# Patient Record
Sex: FEMALE | Race: WHITE | Employment: FULL TIME | URBAN - NONMETROPOLITAN AREA
[De-identification: names, ages, dates, MRNs, and addresses within clinical notes are randomized per-mention and may not be internally consistent; named-entity substitution may affect disease eponyms.]

---

## 2024-01-18 NOTE — PROGRESS NOTES
Spoke with patient and she will arrive at [facility] [place] at 0915 on 1/26/2024 for her procedure at 1045. Patient has been given instructions.

## 2024-01-24 NOTE — PROGRESS NOTES
1/24/26 - Patient called to review instructions for surgery on 1/26/24    Surgery @ [facility] on 1/26/24 at 1045, arrival 0845     NOTHING TO EAT OR DRINK AFTER MIDNIGHT DAY OF SURGERY     1. Enter thru the hospital main entrance on day of surgery, check in at the Information Desk. If you arrive prior to 6:00am, enter thru the ER entrance.     2. Follow the directions as prescribed by the doctor for your procedure and medications.          Morning of surgery take:  Levothyroxine, Sucralfate & Nexium          Stop vitamins, supplements and NSAIDS:  7 days prior to surgery     3. Check with your Doctor regarding stopping blood thinners and follow their instructions.     4. Do not smoke, vape or use chewing tobacco morning of surgery. Do not drink any alcoholic beverages 24 hours prior to surgery.       This includes NA Beer. No street drugs 7 days prior to surgery.     5. If you have dentures, contacts of glasses they will be removed before going to the OR; please bring a case.     6. Please bring picture ID, insurance card, paperwork from the doctor’s office (H & P, Consent, & card for implantable devices).     7. You will need a responsible adult to drive you home after surgery

## 2024-01-25 ENCOUNTER — ANESTHESIA EVENT (OUTPATIENT)
Dept: OPERATING ROOM | Age: 54
End: 2024-01-25
Payer: COMMERCIAL

## 2024-01-25 NOTE — ANESTHESIA PRE PROCEDURE
(CLARITIN) 10 MG capsule Take 1 capsule by mouth daily     • Multiple Vitamins-Minerals (THERAPEUTIC MULTIVITAMIN-MINERALS) tablet Take 1 tablet by mouth daily     • hydrOXYzine HCl (ATARAX) 25 MG tablet Take 1 tablet by mouth every 6 hours as needed for Anxiety     • citalopram (CELEXA) 20 MG tablet Take 1 tablet by mouth daily     • tiZANidine (ZANAFLEX) 2 MG tablet Take 1 tablet by mouth every 8 hours as needed     • sucralfate (CARAFATE) 1 GM tablet Take 1 tablet by mouth 4 times daily     • esomeprazole Magnesium (NEXIUM) 40 MG PACK Take 1 packet by mouth daily     • famotidine (PEPCID) 40 MG tablet Take 1 tablet by mouth nightly     • levothyroxine (SYNTHROID) 125 MCG tablet Take 1 tablet by mouth Daily     • colchicine (COLCRYS) 0.6 MG tablet Take 1 tablet by mouth daily     • Multiple Vitamins-Minerals (ICAPS AREDS FORMULA PO) Take 1 capsule by mouth daily         Allergies:    Allergies   Allergen Reactions   • Cymbalta [Duloxetine Hcl] Other (See Comments)     Agitation   • Prednisone Other (See Comments)     Agitation       Problem List:  There is no problem list on file for this patient.      Past Medical History:        Diagnosis Date   • Anxiety    • Depression    • GERD (gastroesophageal reflux disease)    • Hallux rigidus of right foot    • Hyperlipidemia    • Macular degeneration disease    • Thyroid disease        Past Surgical History:        Procedure Laterality Date   • CARPAL TUNNEL RELEASE Bilateral    • CHOLECYSTECTOMY  2014   • ESOPHAGOGASTRODUODENOSCOPY  2021       Social History:    Social History     Tobacco Use   • Smoking status: Former     Current packs/day: 0.00     Types: Cigarettes     Quit date: 2019     Years since quittin.0   • Smokeless tobacco: Current   Substance Use Topics   • Alcohol use: Not Currently     Alcohol/week: 3.0 standard drinks of alcohol     Types: 3 Cans of beer per week                                Ready to quit: Not Answered  Counseling given:

## 2024-01-26 ENCOUNTER — APPOINTMENT (OUTPATIENT)
Dept: GENERAL RADIOLOGY | Age: 54
End: 2024-01-26
Attending: PODIATRIST
Payer: COMMERCIAL

## 2024-01-26 ENCOUNTER — ANESTHESIA (OUTPATIENT)
Dept: OPERATING ROOM | Age: 54
End: 2024-01-26
Payer: COMMERCIAL

## 2024-01-26 ENCOUNTER — HOSPITAL ENCOUNTER (OUTPATIENT)
Age: 54
Setting detail: OUTPATIENT SURGERY
Discharge: HOME | End: 2024-01-26
Attending: PODIATRIST | Admitting: PODIATRIST
Payer: COMMERCIAL

## 2024-01-26 VITALS
RESPIRATION RATE: 12 BRPM | HEART RATE: 69 BPM | TEMPERATURE: 96.8 F | DIASTOLIC BLOOD PRESSURE: 75 MMHG | SYSTOLIC BLOOD PRESSURE: 122 MMHG | OXYGEN SATURATION: 98 %

## 2024-01-26 DIAGNOSIS — M20.5X1 HALLUX LIMITUS OF RIGHT FOOT: Primary | ICD-10-CM

## 2024-01-26 PROCEDURE — 76000 FLUOROSCOPY <1 HR PHYS/QHP: CPT

## 2024-01-26 PROCEDURE — 7100000001 HC PACU RECOVERY - ADDTL 15 MIN: Performed by: PODIATRIST

## 2024-01-26 PROCEDURE — 6360000002 HC RX W HCPCS: Performed by: PODIATRIST

## 2024-01-26 PROCEDURE — 7100000000 HC PACU RECOVERY - FIRST 15 MIN: Performed by: PODIATRIST

## 2024-01-26 PROCEDURE — 7100000010 HC PHASE II RECOVERY - FIRST 15 MIN: Performed by: PODIATRIST

## 2024-01-26 PROCEDURE — 6370000000 HC RX 637 (ALT 250 FOR IP): Performed by: PODIATRIST

## 2024-01-26 PROCEDURE — 3700000001 HC ADD 15 MINUTES (ANESTHESIA): Performed by: PODIATRIST

## 2024-01-26 PROCEDURE — 3600000004 HC SURGERY LEVEL 4 BASE: Performed by: PODIATRIST

## 2024-01-26 PROCEDURE — 7100000011 HC PHASE II RECOVERY - ADDTL 15 MIN: Performed by: PODIATRIST

## 2024-01-26 PROCEDURE — 2580000003 HC RX 258: Performed by: PODIATRIST

## 2024-01-26 PROCEDURE — 2500000003 HC RX 250 WO HCPCS: Performed by: NURSE ANESTHETIST, CERTIFIED REGISTERED

## 2024-01-26 PROCEDURE — 3600000014 HC SURGERY LEVEL 4 ADDTL 15MIN: Performed by: PODIATRIST

## 2024-01-26 PROCEDURE — 2709999900 HC NON-CHARGEABLE SUPPLY: Performed by: PODIATRIST

## 2024-01-26 PROCEDURE — 6360000002 HC RX W HCPCS: Performed by: NURSE ANESTHETIST, CERTIFIED REGISTERED

## 2024-01-26 PROCEDURE — 3700000000 HC ANESTHESIA ATTENDED CARE: Performed by: PODIATRIST

## 2024-01-26 PROCEDURE — 2720000010 HC SURG SUPPLY STERILE: Performed by: PODIATRIST

## 2024-01-26 PROCEDURE — C1713 ANCHOR/SCREW BN/BN,TIS/BN: HCPCS | Performed by: PODIATRIST

## 2024-01-26 DEVICE — ANATOMIC BIPLANAR FIXATION
Type: IMPLANTABLE DEVICE | Site: FOOT | Status: FUNCTIONAL
Brand: LAPIPLASTY SYSTEM 1

## 2024-01-26 DEVICE — 2.7MM HIGH PITCH LOCKING SCREW - 12MM/14MM
Type: IMPLANTABLE DEVICE | Site: FOOT | Status: FUNCTIONAL
Brand: FASTPITCH

## 2024-01-26 RX ORDER — BUPIVACAINE HYDROCHLORIDE 5 MG/ML
INJECTION, SOLUTION EPIDURAL; INTRACAUDAL
Status: COMPLETED | OUTPATIENT
Start: 2024-01-26 | End: 2024-01-26

## 2024-01-26 RX ORDER — OXYCODONE HYDROCHLORIDE AND ACETAMINOPHEN 5; 325 MG/1; MG/1
1 TABLET ORAL ONCE
Status: COMPLETED | OUTPATIENT
Start: 2024-01-26 | End: 2024-01-26

## 2024-01-26 RX ORDER — SODIUM CHLORIDE 0.9 % (FLUSH) 0.9 %
5-40 SYRINGE (ML) INJECTION EVERY 12 HOURS SCHEDULED
Status: CANCELLED | OUTPATIENT
Start: 2024-01-26

## 2024-01-26 RX ORDER — ONDANSETRON 2 MG/ML
INJECTION INTRAMUSCULAR; INTRAVENOUS PRN
Status: DISCONTINUED | OUTPATIENT
Start: 2024-01-26 | End: 2024-01-26 | Stop reason: SDUPTHER

## 2024-01-26 RX ORDER — PROPOFOL 10 MG/ML
INJECTION, EMULSION INTRAVENOUS PRN
Status: DISCONTINUED | OUTPATIENT
Start: 2024-01-26 | End: 2024-01-26 | Stop reason: SDUPTHER

## 2024-01-26 RX ORDER — SODIUM CHLORIDE 9 MG/ML
INJECTION, SOLUTION INTRAVENOUS PRN
Status: CANCELLED | OUTPATIENT
Start: 2024-01-26

## 2024-01-26 RX ORDER — FENTANYL CITRATE 50 UG/ML
INJECTION, SOLUTION INTRAMUSCULAR; INTRAVENOUS PRN
Status: DISCONTINUED | OUTPATIENT
Start: 2024-01-26 | End: 2024-01-26 | Stop reason: SDUPTHER

## 2024-01-26 RX ORDER — SODIUM CHLORIDE, SODIUM LACTATE, POTASSIUM CHLORIDE, CALCIUM CHLORIDE 600; 310; 30; 20 MG/100ML; MG/100ML; MG/100ML; MG/100ML
INJECTION, SOLUTION INTRAVENOUS CONTINUOUS
Status: DISCONTINUED | OUTPATIENT
Start: 2024-01-26 | End: 2024-01-26 | Stop reason: HOSPADM

## 2024-01-26 RX ORDER — SODIUM CHLORIDE 0.9 % (FLUSH) 0.9 %
5-40 SYRINGE (ML) INJECTION PRN
Status: CANCELLED | OUTPATIENT
Start: 2024-01-26

## 2024-01-26 RX ORDER — LIDOCAINE HYDROCHLORIDE 20 MG/ML
INJECTION, SOLUTION EPIDURAL; INFILTRATION; INTRACAUDAL; PERINEURAL PRN
Status: DISCONTINUED | OUTPATIENT
Start: 2024-01-26 | End: 2024-01-26 | Stop reason: SDUPTHER

## 2024-01-26 RX ORDER — FENTANYL CITRATE 50 UG/ML
25 INJECTION, SOLUTION INTRAMUSCULAR; INTRAVENOUS EVERY 5 MIN PRN
Status: CANCELLED | OUTPATIENT
Start: 2024-01-26

## 2024-01-26 RX ADMIN — LIDOCAINE HYDROCHLORIDE 100 MG: 20 INJECTION, SOLUTION EPIDURAL; INFILTRATION; INTRACAUDAL; PERINEURAL at 12:19

## 2024-01-26 RX ADMIN — SODIUM CHLORIDE, POTASSIUM CHLORIDE, SODIUM LACTATE AND CALCIUM CHLORIDE: 600; 310; 30; 20 INJECTION, SOLUTION INTRAVENOUS at 09:22

## 2024-01-26 RX ADMIN — FENTANYL CITRATE 50 MCG: 50 INJECTION INTRAMUSCULAR; INTRAVENOUS at 12:27

## 2024-01-26 RX ADMIN — FENTANYL CITRATE 50 MCG: 50 INJECTION INTRAMUSCULAR; INTRAVENOUS at 12:23

## 2024-01-26 RX ADMIN — PROPOFOL 200 MG: 10 INJECTION, EMULSION INTRAVENOUS at 12:19

## 2024-01-26 RX ADMIN — FENTANYL CITRATE 50 MCG: 50 INJECTION INTRAMUSCULAR; INTRAVENOUS at 13:14

## 2024-01-26 RX ADMIN — OXYCODONE AND ACETAMINOPHEN 1 TABLET: 5; 325 TABLET ORAL at 14:42

## 2024-01-26 RX ADMIN — CEFAZOLIN 2000 MG: 2 INJECTION, POWDER, FOR SOLUTION INTRAMUSCULAR; INTRAVENOUS at 12:24

## 2024-01-26 RX ADMIN — ONDANSETRON 4 MG: 2 INJECTION INTRAMUSCULAR; INTRAVENOUS at 12:26

## 2024-01-26 RX ADMIN — FENTANYL CITRATE 50 MCG: 50 INJECTION INTRAMUSCULAR; INTRAVENOUS at 12:45

## 2024-01-26 ASSESSMENT — PAIN - FUNCTIONAL ASSESSMENT
PAIN_FUNCTIONAL_ASSESSMENT: PREVENTS OR INTERFERES SOME ACTIVE ACTIVITIES AND ADLS
PAIN_FUNCTIONAL_ASSESSMENT: 0-10
PAIN_FUNCTIONAL_ASSESSMENT: ACTIVITIES ARE NOT PREVENTED

## 2024-01-26 ASSESSMENT — PAIN DESCRIPTION - DESCRIPTORS
DESCRIPTORS: BURNING;STABBING
DESCRIPTORS: ACHING

## 2024-01-26 ASSESSMENT — PAIN SCALES - GENERAL: PAINLEVEL_OUTOF10: 7

## 2024-01-26 NOTE — PROGRESS NOTES
1429 received pt from pacu.  Pt is awake and alert, monitors applied, alarms on, family is at bedside, vss, ice chips, starry and crackers given.  Pt c/o pain 9/10.  Pt medicated for pain @ 1442.  Pt rates pain @ 7/10, tolerable @ 1512.  Pt ready to get dressed for d/c, vss, family at bedside.  D/c instructions given to , Ed,  and pt's mother.  Iv removed.  Pt to get dressed for d/c.  1530 pt d/c with  to home via w/c per nurse.  Pt stable at d/c.

## 2024-01-26 NOTE — BRIEF OP NOTE
Brief Postoperative Note      Patient: Olinda Monson  YOB: 1970  MRN: [id number]    Date of Procedure: 1/26/2024    Pre-Op Diagnosis Codes:     * Hallux rigidus, right foot [M20.21]     * Pain in right foot [M79.671]    Post-Op Diagnosis: Same       Procedure(s):  RIGHT GREAT TOE MP JT ARTHRODESIS  RIGHT REMOVAL MAJOR/LARGE BONE FOR GRAFT    Surgeon(s):  Daniele Chen DPM    Assistant:  * No surgical staff found *    Anesthesia: General    Estimated Blood Loss (mL): Minimal    Complications: None    Specimens:   * No specimens in log *    Implants:  Implant Name Type Inv. Item Serial No.  Lot No. LRB No. Used Action   PLATE BNE W3.6MM W/ 5 L12MM DIA2.5MM AND 4 L14MM DIA2.5MM - [id number]  PLATE BNE W3.6MM W/ 5 L12MM DIA2.5MM AND 4 L14MM DIA2.5MM  [employer]- [id number] Right 1 Implanted   SCREW LOCKING HIGH PITCH 2.7MM FASTPITCH - [id number]  SCREW LOCKING HIGH PITCH 2.7MM FASTPITCH  [employer]- [id number] Right 2 Implanted         Drains: * No LDAs found *    Findings: as expected      Electronically signed by Daniele Chen DPM on 1/26/2024 at 1:57 PM

## 2024-01-26 NOTE — ANESTHESIA POSTPROCEDURE EVALUATION
Department of Anesthesiology  Postprocedure Note    Patient: Olinda Monson  MRN: [id number]  YOB: 1970  Date of evaluation: 1/26/2024    Procedure Summary       Date: 01/26/24 Room / Location: [address]    Anesthesia Start: 1214 Anesthesia Stop: 1400    Procedures:       RIGHT GREAT TOE MP JT ARTHRODESIS (Right: Foot)      RIGHT REMOVAL MAJOR/LARGE BONE FOR GRAFT (Right: Foot) Diagnosis:       Hallux rigidus, right foot      Pain in right foot      (Hallux rigidus, right foot [M20.21])      (Pain in right foot [M79.671])    Surgeons: Daniele Chen DPM Responsible Provider: Irvin Salazar APRN - CRNA    Anesthesia Type: general ASA Status: 2            Anesthesia Type: No value filed.    Rashi Phase I:      Rashi Phase II:      Anesthesia Post Evaluation    Patient location during evaluation: bedside  Patient participation: complete - patient participated  Level of consciousness: awake and alert  Pain score: 0  Airway patency: patent  Nausea & Vomiting: no vomiting and no nausea  Cardiovascular status: blood pressure returned to baseline and hemodynamically stable  Respiratory status: acceptable, spontaneous ventilation, nonlabored ventilation and nasal cannula  Hydration status: stable  Pain management: adequate    No notable events documented.

## 2024-01-26 NOTE — H&P
..Update History & Physical    The patient's History and Physical of January 26, 2024 was reviewed with the patient and I examined the patient. There was no change. The surgical site was confirmed by the patient and me.       Plan: The risks, benefits, expected outcome, and alternative to the recommended procedure have been discussed with the patient. Patient understands and wants to proceed with the procedure.     Electronically signed by Daniele Chen DPM on 1/26/2024 at 11:49 AM

## 2024-01-26 NOTE — PROGRESS NOTES
Pt. Arrived in PACU via cart from the OR. Brakes applied, side rails up x 2. On oxygen via NC @ 4L. Pt. Drowsy, but arouses to voice and touch. Denies pain or nausea at this time. Dressing to right foot is clean, dry, intact. No drainage noted. Foot elevated off of bed on blankets. IV infusing without difficulty. Bedside report received from DOMINIC Irby and JOSE MANUEL Bryan. Will continue to monitor via bedside.

## 2024-01-26 NOTE — PROGRESS NOTES
Pt. Awake, alert, and oriented x 4. On room air, vs stable. Pt. Denies nausea. C/o pain in right foot at 9/10 now. Will be medicated in [facility] per verbal order from Dr. Chen. Dressing to right foot is clean, dry, intact. No drainage noted. Foot elevated with ice pack in place. Iv infusing without difficulty. Pt. Will be transported back to [facility] via cart.

## 2024-01-26 NOTE — DISCHARGE INSTRUCTIONS
[facility]  [phone]    Do not drive, work around machines or use equipment.  Do not drink any alcoholic beverages.  Do not smoke while alone.  Avoid making important decisions.  Plan to spend a quiet, relaxed evening @ home.  Resume normal activities as you begin to feel better.  Eat lightly for your first meal, then gradually increase your diet to what is normal for you.  In case of nausea, avoid food and drink only clear liquids.  Resume food as nausea ceases.  Notify your surgeon if you experience fever, chills, large amount of bleeding, difficulty breathing, persistent nausea and vomiting or any other disturbing problem.  Call for a follow-up appointment with your surgeon.

## 2024-01-27 NOTE — OP NOTE
[address] [address]                                OPERATIVE REPORT    PATIENT NAME: TYLER ZHONG                    :        1970  MED REC NO:   [id number]                          ROOM:  ACCOUNT NO:   [id number]                           ADMIT DATE: 2024  PROVIDER:     Daniele Chen DPM    DATE OF PROCEDURE:  2024    ATTENDING PROVIDER:  Daniele Chen DPM    PREOPERATIVE DIAGNOSIS:  Hallux limitus on the right.    POSTOPERATIVE DIAGNOSIS:  Hallux limitus on the right.    PROCEDURES:  1.  Right great toe first MPJ arthrodesis.  2.  Calcaneal bone graft harvest.    ANESTHESIA:  General.    HEMOSTASIS:  Pneumatic ankle tourniquet set at 250 mmHg.    ESTIMATED BLOOD LOSS:  Minimal.    MATERIALS:  Two four-hole Treace locking plates for the MPJ, each plate  utilizing two 2.7 mm x 14 mm FastPitch locking screws and two 2.7 mm x  12 mm FastPitch locking screws, the FastGrafter was also utilized, and  we got 1 mL of bone graft from the calcaneus as well.    INJECTABLES:  18 mL of Marcaine plain.    COMPLICATIONS:  None.    CONDITION:  Stable.    INDICATIONS FOR PROCEDURE:  The patient is a very pleasant 53-year-old  female that has been seen in the office for quite sometime.  The patient  has had continuing pain associated to the right great toe.  The patient  subsequently was educated about her hallux limitus and arthritic  condition of the right great toe.  I went over all risks, benefits, and  complications of the procedure in detail.  The patient chose and elected  to proceed with surgical intervention versus that of conservative  options.  The patient's consent was signed and submitted to the chart.   All questions were answered prior to proceeding.  The patient  understands that initially it is a six-to-eight weeks recovery, she may  not get her final result for up to a year.    OPERATIVE PROCEDURE:   we removed the excess bone  with a rongeur and then cleaned up the area, debrided any thickening of  the capsule, irrigated the region with copious amounts of normal sterile  saline, and then, we used a retrograding K-wire technique to get the  great toe into the position that we wanted to so we could fuse it.   Following this, we did another K-wire to hold the rotation that was put  it like an angle that went from medial to lateral and gave us further  stabilization.  At this time, we went ahead and put in our lateral  plate.  We did that using proper AO technique for the Treace set, and we  put our 14 mm screws proximal and our 12 mm screws distal.  We got  excellent position and alignment on that and so next we moved medially.   We once again put our plate into position and utilized the proper  technique for this and placed our screws 14 mm proximal x2 and 12 mm 2.7  screws distal x2.  This gave a very good strong construct.  It should be  noted that we did have _____ flat surface to see, and we had just about  4 mm of dorsiflexion and noncontact with the great toe joint what we  would want to help with _____.  It should be noted that prior to that,  we did pack in the 1 mL of bone graft harvest into the site before we  completed and plated this region.  All K-wires were removed.  The area  was then closed with 3-0 Vicryl for the capsule, 4-0 Vicryl for the  subcutaneous and subcuticular regions, and 4-0 Nylon for the skin.  We  did two simple interrupted sutures distally as well.  The patient then  had 18 mL of Marcaine plain injected, then it was dressed with  Betadine-soaked Adaptic, 4 x 4's, Kerlix, and an Ace .  The patient  tolerated the procedure and anesthesia well and left the OR with vital  signs stable and vascular status intact to the right foot.  The patient  will be seen back in our office next week for a followup appointment.   She has Percocet for pain.  She has a scooter.  She has crutches.

## (undated) DEVICE — MARKER SURG SKIN UTIL REGULAR/FINE 2 TIP W/ RUL AND 9 LBL

## (undated) DEVICE — ANESTHESIA CIRCUIT ADULT-LF: Brand: MEDLINE INDUSTRIES, INC.

## (undated) DEVICE — NEEDLE HYPO 25GA L1.5IN BLU POLYPR HUB S STL REG BVL STR

## (undated) DEVICE — GLOVE ORANGE PI 8   MSG9080

## (undated) DEVICE — SYRINGE IRRIG 60ML SFT PLIABLE BLB EZ TO GRP 1 HND USE W/

## (undated) DEVICE — SUTURE VCRL SZ 4-0 L18IN ABSRB UD L13MM P-3 3/8 CIR PRIM J494H

## (undated) DEVICE — DRESSING PETRO W3XL3IN OIL EMUL N ADH GZ KNIT IMPREG CELOS

## (undated) DEVICE — YANKAUER,FLEXIBLE HANDLE,REGLR CAPACITY: Brand: MEDLINE INDUSTRIES, INC.

## (undated) DEVICE — SOLUTION PREP PAINT POV IOD FOR SKIN MUCOUS MEM

## (undated) DEVICE — BANDAGE,ELASTIC,ESMARK,STERILE,4"X9',LF: Brand: MEDLINE

## (undated) DEVICE — C-ARMOR C-ARM EQUIPMENT COVERS CLEAR STERILE UNIVERSAL FIT 12 PER CASE: Brand: C-ARMOR

## (undated) DEVICE — TOWEL,OR,DSP,ST,BLUE,STD,6/PK,12PK/CS: Brand: MEDLINE

## (undated) DEVICE — BANDAGE COMPR W4INXL5YD WHT BGE POLY COT M E WRP WV HK AND

## (undated) DEVICE — DRAPE SHEET ULTRAGARD: Brand: MEDLINE

## (undated) DEVICE — BLADE SURG NO15 C STL SHRP PREM

## (undated) DEVICE — LINER SUCT CANSTR 1500CC SEMI RIG W/ POR HYDROPHOBIC SHUT

## (undated) DEVICE — SUTURE NONABSORBABLE MONOFILAMENT 4-0 P-3 18 IN ETHILON 699H

## (undated) DEVICE — AUTOGRAFT HARVESTING SYSTEM: Brand: FASTGRAFTER

## (undated) DEVICE — TUBING, SUCTION, 1/4" X 10', STRAIGHT: Brand: MEDLINE

## (undated) DEVICE — CONTAINER,SPECIMEN,OR STERILE,4OZ: Brand: MEDLINE

## (undated) DEVICE — CONVERTORS STOCKINETTE: Brand: CONVERTORS

## (undated) DEVICE — SNAP KOVER: Brand: UNBRANDED

## (undated) DEVICE — Z INACTIVE USE 2863041 SPONGE GZ W4XL4IN 100% COT 16 PLY RADPQ HIGHLY ABSRB

## (undated) DEVICE — BLADE SAW 40X11X0.38 MM REPL 8.7 CM TEETH LAPIPLASTY

## (undated) DEVICE — SPONGE GZ W4XL8IN COT WVN 12 PLY

## (undated) DEVICE — SUTURE VCRL SZ 3-0 L27IN ABSRB UD L26MM SH 1/2 CIR J416H

## (undated) DEVICE — SUTURE VCRL SZ 2-0 L18IN ABSRB UD CT-1 L36MM 1/2 CIR J839D

## (undated) DEVICE — APPLICATOR MEDICATED 26 CC SOLUTION HI LT ORNG CHLORAPREP

## (undated) DEVICE — COUNTER NDL 30 COUNT FOAM STRP SGL MAG

## (undated) DEVICE — PENCIL ES CRD L10FT HND SWCHING ROCK SWCH W/ EDGE COAT BLDE

## (undated) DEVICE — BANDAGE,SELF ADHRNT,COFLEX,4"X5YD,STRL: Brand: COLABEL

## (undated) DEVICE — DRAPE,EXTREMITY,89X128,STERILE: Brand: MEDLINE

## (undated) DEVICE — PACK,BASIC,SIRUS,V: Brand: MEDLINE

## (undated) DEVICE — SYRINGE MED 10ML LUERLOCK TIP W/O SFTY DISP

## (undated) DEVICE — ELECTRODE ES AD CRDLSS PT RET REM POLYHESIVE